# Patient Record
Sex: FEMALE | Race: WHITE | NOT HISPANIC OR LATINO | Employment: FULL TIME | URBAN - METROPOLITAN AREA
[De-identification: names, ages, dates, MRNs, and addresses within clinical notes are randomized per-mention and may not be internally consistent; named-entity substitution may affect disease eponyms.]

---

## 2024-08-26 ENCOUNTER — TELEPHONE (OUTPATIENT)
Dept: OBSTETRICS AND GYNECOLOGY | Facility: CLINIC | Age: 29
End: 2024-08-26
Payer: COMMERCIAL

## 2024-08-26 ENCOUNTER — LAB (OUTPATIENT)
Dept: LAB | Facility: LAB | Age: 29
End: 2024-08-26
Payer: COMMERCIAL

## 2024-08-26 DIAGNOSIS — O20.9 FIRST TRIMESTER BLEEDING (HHS-HCC): ICD-10-CM

## 2024-08-26 DIAGNOSIS — O20.9 FIRST TRIMESTER BLEEDING (HHS-HCC): Primary | ICD-10-CM

## 2024-08-26 LAB — B-HCG SERPL-ACNC: 228 MIU/ML

## 2024-08-26 PROCEDURE — 36415 COLL VENOUS BLD VENIPUNCTURE: CPT

## 2024-08-27 DIAGNOSIS — O20.9 FIRST TRIMESTER BLEEDING (HHS-HCC): Primary | ICD-10-CM

## 2024-08-28 ENCOUNTER — LAB (OUTPATIENT)
Dept: LAB | Facility: LAB | Age: 29
End: 2024-08-28
Payer: COMMERCIAL

## 2024-08-28 DIAGNOSIS — O20.9 FIRST TRIMESTER BLEEDING (HHS-HCC): ICD-10-CM

## 2024-08-28 LAB — B-HCG SERPL-ACNC: 526 MIU/ML

## 2024-08-28 PROCEDURE — 36415 COLL VENOUS BLD VENIPUNCTURE: CPT

## 2024-09-04 ENCOUNTER — TELEPHONE (OUTPATIENT)
Dept: OBSTETRICS AND GYNECOLOGY | Facility: CLINIC | Age: 29
End: 2024-09-04
Payer: COMMERCIAL

## 2024-09-04 DIAGNOSIS — O21.9 NAUSEA AND VOMITING IN PREGNANCY PRIOR TO 22 WEEKS GESTATION (HHS-HCC): Primary | ICD-10-CM

## 2024-09-04 RX ORDER — DOXYLAMINE SUCCINATE AND PYRIDOXINE HYDROCHLORIDE 20; 20 MG/1; MG/1
1 TABLET, EXTENDED RELEASE ORAL 2 TIMES DAILY
Qty: 60 TABLET | Refills: 3 | Status: SHIPPED | OUTPATIENT
Start: 2024-09-04

## 2024-09-04 NOTE — TELEPHONE ENCOUNTER
Patient with nausea, but no significant vomiting.  Used Bonjesta last pregnancy, requesting this again. I will send it to her pharmacy.  Vandana Lazar.

## 2024-09-05 ENCOUNTER — TELEPHONE (OUTPATIENT)
Dept: OBSTETRICS AND GYNECOLOGY | Facility: CLINIC | Age: 29
End: 2024-09-05
Payer: COMMERCIAL

## 2024-09-18 ENCOUNTER — LAB (OUTPATIENT)
Dept: LAB | Facility: LAB | Age: 29
End: 2024-09-18
Payer: COMMERCIAL

## 2024-09-18 ENCOUNTER — APPOINTMENT (OUTPATIENT)
Dept: OBSTETRICS AND GYNECOLOGY | Facility: CLINIC | Age: 29
End: 2024-09-18
Payer: COMMERCIAL

## 2024-09-18 VITALS — WEIGHT: 134.8 LBS | DIASTOLIC BLOOD PRESSURE: 70 MMHG | SYSTOLIC BLOOD PRESSURE: 108 MMHG | BODY MASS INDEX: 24.66 KG/M2

## 2024-09-18 DIAGNOSIS — Z34.80 SUPERVISION OF OTHER NORMAL PREGNANCY, ANTEPARTUM (HHS-HCC): ICD-10-CM

## 2024-09-18 DIAGNOSIS — Z32.01 PREGNANCY TEST POSITIVE (HHS-HCC): ICD-10-CM

## 2024-09-18 PROBLEM — O02.0 BLIGHTED OVUM (HHS-HCC): Status: RESOLVED | Noted: 2024-09-18 | Resolved: 2024-09-18

## 2024-09-18 PROBLEM — F41.9 ANXIETY: Status: ACTIVE | Noted: 2024-09-18

## 2024-09-18 LAB
ABO GROUP (TYPE) IN BLOOD: NORMAL
ANTIBODY SCREEN: NORMAL
CRL: 12 MM
ERYTHROCYTE [DISTWIDTH] IN BLOOD BY AUTOMATED COUNT: 12.6 % (ref 11.5–14.5)
EST. AVERAGE GLUCOSE BLD GHB EST-MCNC: 91 MG/DL
HBA1C MFR BLD: 4.8 %
HBV SURFACE AG SERPL QL IA: NONREACTIVE
HCT VFR BLD AUTO: 45 % (ref 36–46)
HCV AB SER QL: NONREACTIVE
HGB BLD-MCNC: 14.7 G/DL (ref 12–16)
HIV 1+2 AB+HIV1 P24 AG SERPL QL IA: NONREACTIVE
MCH RBC QN AUTO: 31 PG (ref 26–34)
MCHC RBC AUTO-ENTMCNC: 32.7 G/DL (ref 32–36)
MCV RBC AUTO: 95 FL (ref 80–100)
NRBC BLD-RTO: 0 /100 WBCS (ref 0–0)
PLATELET # BLD AUTO: 186 X10*3/UL (ref 150–450)
PREGNANCY TEST URINE, POC: POSITIVE
RBC # BLD AUTO: 4.74 X10*6/UL (ref 4–5.2)
REFLEX ADDED, ANEMIA PANEL: NORMAL
RH FACTOR (ANTIGEN D): NORMAL
RUBV IGG SERPL IA-ACNC: 2.5 IA
RUBV IGG SERPL QL IA: POSITIVE
TREPONEMA PALLIDUM IGG+IGM AB [PRESENCE] IN SERUM OR PLASMA BY IMMUNOASSAY: NONREACTIVE
WBC # BLD AUTO: 11.9 X10*3/UL (ref 4.4–11.3)

## 2024-09-18 PROCEDURE — 0500F INITIAL PRENATAL CARE VISIT: CPT | Performed by: NURSE PRACTITIONER

## 2024-09-18 PROCEDURE — 83036 HEMOGLOBIN GLYCOSYLATED A1C: CPT

## 2024-09-18 PROCEDURE — 86317 IMMUNOASSAY INFECTIOUS AGENT: CPT

## 2024-09-18 PROCEDURE — 86850 RBC ANTIBODY SCREEN: CPT

## 2024-09-18 PROCEDURE — 87340 HEPATITIS B SURFACE AG IA: CPT

## 2024-09-18 PROCEDURE — 85027 COMPLETE CBC AUTOMATED: CPT

## 2024-09-18 PROCEDURE — 86780 TREPONEMA PALLIDUM: CPT

## 2024-09-18 PROCEDURE — 87389 HIV-1 AG W/HIV-1&-2 AB AG IA: CPT

## 2024-09-18 PROCEDURE — 36415 COLL VENOUS BLD VENIPUNCTURE: CPT

## 2024-09-18 PROCEDURE — 86803 HEPATITIS C AB TEST: CPT

## 2024-09-18 PROCEDURE — 86900 BLOOD TYPING SEROLOGIC ABO: CPT

## 2024-09-18 PROCEDURE — 76817 TRANSVAGINAL US OBSTETRIC: CPT | Performed by: NURSE PRACTITIONER

## 2024-09-18 PROCEDURE — 86901 BLOOD TYPING SEROLOGIC RH(D): CPT

## 2024-09-18 PROCEDURE — 81025 URINE PREGNANCY TEST: CPT | Performed by: NURSE PRACTITIONER

## 2024-09-18 ASSESSMENT — ENCOUNTER SYMPTOMS
NEUROLOGICAL NEGATIVE: 1
ALLERGIC/IMMUNOLOGIC NEGATIVE: 1
PSYCHIATRIC NEGATIVE: 1
RESPIRATORY NEGATIVE: 1
CONSTITUTIONAL NEGATIVE: 1
CARDIOVASCULAR NEGATIVE: 1
MUSCULOSKELETAL NEGATIVE: 1
ENDOCRINE NEGATIVE: 1
EYES NEGATIVE: 1
HEMATOLOGIC/LYMPHATIC NEGATIVE: 1
NAUSEA: 1

## 2024-09-18 NOTE — PROGRESS NOTES
HPI    LMP 2024   7.5 WKS  DEREK 2025  G 3, P 1  Last edited by Yamilet López MA on 2024 10:18 AM.         India Abarca is a 29 y.o.  female who presents for a pregnancy confirmation.     Patient's last menstrual period was 2024.  She is 7w5d with DEREK 2025.  Patient's menstrual cycles are regular, every 30 days, lasting 6-7 days.  She had +home pregnancy test on .   Did have light bleeding end of August, no bleeding since.   Reports symptoms of nausea, breast tenderness.   She is a non-smoker.    Her pregnancy is complicated by:  Uncomplicated    Last pap 2022: Negative    PMH  Anxiety    /70   Wt 61.1 kg (134 lb 12.8 oz)   LMP 2024   BMI 24.66 kg/m²      Current Outpatient Medications   Medication Instructions    doxylamine-pyridoxine, vit B6, (Bonjesta) 20-20 mg tablet,IR,delayed rel,biphasic 1 tablet, oral, 2 times daily, Once in the morning and once at bedtime.        Review of Systems   Constitutional: Negative.    HENT: Negative.     Eyes: Negative.    Respiratory: Negative.     Cardiovascular: Negative.    Gastrointestinal:  Positive for nausea.   Endocrine: Negative.    Genitourinary: Negative.    Musculoskeletal: Negative.    Skin: Negative.    Allergic/Immunologic: Negative.    Neurological: Negative.    Hematological: Negative.    Psychiatric/Behavioral: Negative.     All other systems reviewed and are negative.       Physical Exam  Constitutional:       Appearance: Normal appearance.   HENT:      Head: Normocephalic.      Nose: Nose normal.   Pulmonary:      Effort: Pulmonary effort is normal.   Genitourinary:     General: Normal vulva.      Vagina: Normal.      Cervix: Normal.      Uterus: Normal.       Adnexa: Right adnexa normal and left adnexa normal.   Musculoskeletal:         General: Normal range of motion.      Cervical back: Normal range of motion and neck supple.   Neurological:      Mental Status: She is alert.   Psychiatric:          Mood and Affect: Mood normal.         Behavior: Behavior normal.     BSUS: Single IUP with +YS and +FCA. CRL 7w3d which is c/w LMP    Assessment/Plan   Diagnoses and all orders for this visit:  Pregnancy test positive ([facility])  -Seen today for confirmation of pregnancy.  -Pregnancy confirmed with +urine pregnancy test and trans-vaginal US performed today.  -     POCT pregnancy, urine manually resulted  -     US OB transvaginal   Supervision of other normal pregnancy, antepartum ([facility])  -Viable, mancini pregnancy with +fetal heartbeat and US confirming gestational age that is c/w LMP.  -DEREK 05/02/2025.  -Orders for prenatal lab work today.  -     CBC Anemia Panel With Reflex,Pregnancy; Future  -     Hemoglobin A1C; Future  -     Hepatitis B Surface Antigen; Future  -     Hepatitis C Antibody; Future  -     HIV 1/2 Antigen/Antibody Screen with Reflex to Confirmation; Future  -     Rubella Antibody, IgG; Future  -     Syphilis Screen with Reflex; Future  -     Type And Screen; Future  -Genetic screening options discussed, patient planning on NIPT.   -Follow up for new OB 2 weeks.

## 2024-10-02 ENCOUNTER — APPOINTMENT (OUTPATIENT)
Dept: OBSTETRICS AND GYNECOLOGY | Facility: CLINIC | Age: 29
End: 2024-10-02
Payer: COMMERCIAL

## 2024-10-02 VITALS — DIASTOLIC BLOOD PRESSURE: 66 MMHG | BODY MASS INDEX: 24.87 KG/M2 | WEIGHT: 136 LBS | SYSTOLIC BLOOD PRESSURE: 100 MMHG

## 2024-10-02 DIAGNOSIS — Z13.79 GENETIC TESTING: ICD-10-CM

## 2024-10-02 DIAGNOSIS — O21.9 NAUSEA AND VOMITING IN PREGNANCY PRIOR TO 22 WEEKS GESTATION: ICD-10-CM

## 2024-10-02 DIAGNOSIS — Z34.80 SUPERVISION OF OTHER NORMAL PREGNANCY, ANTEPARTUM (HHS-HCC): ICD-10-CM

## 2024-10-02 LAB
POC APPEARANCE, URINE: CLEAR
POC BILIRUBIN, URINE: NEGATIVE
POC BLOOD, URINE: NEGATIVE
POC COLOR, URINE: YELLOW
POC GLUCOSE, URINE: NEGATIVE MG/DL
POC KETONES, URINE: NEGATIVE MG/DL
POC LEUKOCYTES, URINE: NEGATIVE
POC NITRITE,URINE: NEGATIVE
POC PH, URINE: 6.5 PH
POC PROTEIN, URINE: NEGATIVE MG/DL
POC SPECIFIC GRAVITY, URINE: 1.01
POC UROBILINOGEN, URINE: 0.2 EU/DL

## 2024-10-02 PROCEDURE — 0501F PRENATAL FLOW SHEET: CPT | Performed by: NURSE PRACTITIONER

## 2024-10-02 PROCEDURE — 87491 CHLMYD TRACH DNA AMP PROBE: CPT

## 2024-10-02 PROCEDURE — 87591 N.GONORRHOEAE DNA AMP PROB: CPT

## 2024-10-02 PROCEDURE — 87086 URINE CULTURE/COLONY COUNT: CPT

## 2024-10-02 RX ORDER — DOXYLAMINE SUCCINATE AND PYRIDOXINE HYDROCHLORIDE 20; 20 MG/1; MG/1
1 TABLET, EXTENDED RELEASE ORAL 2 TIMES DAILY
Qty: 60 TABLET | Refills: 3 | Status: SHIPPED | OUTPATIENT
Start: 2024-10-02

## 2024-10-02 NOTE — PROGRESS NOTES
India Abarca is a  at 9w5d with a working estimated date of delivery of 2025, by Last Menstrual Period who presents for a routine prenatal visit.   She is feeling well. Denies vaginal bleeding. Nausea improved with Bonjesta.     Prenatal labs normal. Rh+  Pap 2022 negative  NG/CT, Ucx collected today  Genetic screening: Options reviewed and discussed. Desires NIPT and first trimester ultrasound, ordered today.     Her pregnancy is complicated by:  Uncomplicated    Objective   Physical Exam  Weight: 61.7 kg (136 lb), Pregravid BMI: 24.69  Expected Total Weight Gain: 11.5 kg (25 lb)-16 kg (35 lb)   BP: 100/66  Fetal Heart Rate:  (+US)      Imaging  BSUS: S=D, +FCA     Assessment/Plan   Diagnoses and all orders for this visit:  Supervision of other normal pregnancy, antepartum ([facility])  -     POCT UA (nonautomated) manually resulted  -     Urine Culture  -     [facility] Prequel Prenatal Screen; Future  -     C. trachomatis / N. gonorrhoeae, Amplified  -     US MAC OB imaging order; Future  Nausea and vomiting in pregnancy prior to 22 weeks gestation  -     doxylamine-pyridoxine, vit B6, (Bonjesta) 20-20 mg tablet,IR,delayed rel,biphasic; Take 1 tablet by mouth 2 times a day. Once in the morning and once at bedtime.  Genetic testing  -     [facility] Prequel Prenatal Screen; Future  -     US MAC OB imaging order; Future  Patient oriented to practice including shared OB call and how to reach physician on call  NG/CT, urine cultures collected  Prequel and first trimester US ordered  Refills Bonjesta  Follow up in 4 weeks for a routine prenatal visit or sooner if needed

## 2024-10-03 LAB
BACTERIA UR CULT: NORMAL
C TRACH RRNA SPEC QL NAA+PROBE: NEGATIVE
N GONORRHOEA DNA SPEC QL PROBE+SIG AMP: NEGATIVE

## 2024-10-07 ENCOUNTER — LAB (OUTPATIENT)
Dept: LAB | Facility: LAB | Age: 29
End: 2024-10-07
Payer: COMMERCIAL

## 2024-10-17 LAB
COMMENTS - MP RESULT TYPE: NORMAL
SCAN RESULT: NORMAL

## 2024-10-21 PROBLEM — Z34.90 ENCOUNTER FOR SUPERVISION OF NORMAL PREGNANCY, ANTEPARTUM: Status: ACTIVE | Noted: 2024-10-21

## 2024-10-22 ENCOUNTER — HOSPITAL ENCOUNTER (OUTPATIENT)
Dept: RADIOLOGY | Facility: CLINIC | Age: 29
Discharge: HOME | End: 2024-10-22
Payer: COMMERCIAL

## 2024-10-22 DIAGNOSIS — Z34.80 SUPERVISION OF OTHER NORMAL PREGNANCY, ANTEPARTUM (HHS-HCC): ICD-10-CM

## 2024-10-22 DIAGNOSIS — Z13.79 GENETIC TESTING: ICD-10-CM

## 2024-10-22 PROCEDURE — 76813 OB US NUCHAL MEAS 1 GEST: CPT | Performed by: OBSTETRICS & GYNECOLOGY

## 2024-10-22 PROCEDURE — 76813 OB US NUCHAL MEAS 1 GEST: CPT

## 2024-10-31 ENCOUNTER — APPOINTMENT (OUTPATIENT)
Dept: OBSTETRICS AND GYNECOLOGY | Facility: CLINIC | Age: 29
End: 2024-10-31
Payer: COMMERCIAL

## 2024-10-31 VITALS — WEIGHT: 139.6 LBS | BODY MASS INDEX: 25.53 KG/M2 | SYSTOLIC BLOOD PRESSURE: 130 MMHG | DIASTOLIC BLOOD PRESSURE: 82 MMHG

## 2024-10-31 DIAGNOSIS — Z34.80 SUPERVISION OF OTHER NORMAL PREGNANCY, ANTEPARTUM (HHS-HCC): ICD-10-CM

## 2024-10-31 DIAGNOSIS — Z36.3 SCREENING, ANTENATAL, FOR MALFORMATION BY ULTRASOUND: Primary | ICD-10-CM

## 2024-10-31 LAB
POC BLOOD, URINE: NEGATIVE
POC GLUCOSE, URINE: NEGATIVE MG/DL
POC KETONES, URINE: ABNORMAL MG/DL
POC LEUKOCYTES, URINE: NEGATIVE
POC NITRITE,URINE: NEGATIVE
POC PROTEIN, URINE: NEGATIVE MG/DL

## 2024-10-31 PROCEDURE — 0501F PRENATAL FLOW SHEET: CPT | Performed by: OBSTETRICS & GYNECOLOGY

## 2024-11-21 ENCOUNTER — TELEPHONE (OUTPATIENT)
Dept: OBSTETRICS AND GYNECOLOGY | Facility: CLINIC | Age: 29
End: 2024-11-21
Payer: COMMERCIAL

## 2024-11-29 ENCOUNTER — APPOINTMENT (OUTPATIENT)
Dept: OBSTETRICS AND GYNECOLOGY | Facility: CLINIC | Age: 29
End: 2024-11-29
Payer: COMMERCIAL

## 2024-11-29 VITALS — BODY MASS INDEX: 27.07 KG/M2 | SYSTOLIC BLOOD PRESSURE: 120 MMHG | WEIGHT: 148 LBS | DIASTOLIC BLOOD PRESSURE: 64 MMHG

## 2024-11-29 DIAGNOSIS — Z34.80 SUPERVISION OF OTHER NORMAL PREGNANCY, ANTEPARTUM (HHS-HCC): ICD-10-CM

## 2024-11-29 LAB
POC BLOOD, URINE: NEGATIVE
POC GLUCOSE, URINE: NEGATIVE MG/DL
POC KETONES, URINE: NEGATIVE MG/DL
POC LEUKOCYTES, URINE: NEGATIVE
POC NITRITE,URINE: NEGATIVE
POC PROTEIN, URINE: ABNORMAL MG/DL

## 2024-11-29 PROCEDURE — 0501F PRENATAL FLOW SHEET: CPT | Performed by: OBSTETRICS & GYNECOLOGY

## 2024-11-29 NOTE — PROGRESS NOTES
Subjective   Patient ID [id number]   India Saleem is a 29 y.o.  at 18w0d with a working estimated date of delivery of 2025, by Last Menstrual Period who presents for a routine prenatal visit. She denies vaginal bleeding, leakage of fluid, decreased fetal movements, or contractions.    Her pregnancy is complicated by:  Uncomplicated    Objective   Physical Exam  Weight: 67.1 kg (148 lb)  Expected Total Weight Gain: 11.5 kg (25 lb)-16 kg (35 lb)   Pregravid BMI: 24.69  BP: 120/64         Prenatal Labs  Urine dip:  Lab Results   Component Value Date    KETONESU TRACE (A) 10/31/2024       Lab Results   Component Value Date    HGB 12.9 2024    HCT 38.1 2024    ABO O 2024    HEPBSAG Nonreactive 2024           Imaging: anatomy US next week    Assessment/Plan   Problem List Items Addressed This Visit             ICD-10-CM    Encounter for supervision of normal pregnancy, antepartum Z34.90    Relevant Orders    POCT UA Automated manually resulted     Medical Problems       Problem List       Encounter for supervision of normal pregnancy, antepartum    Overview Addendum 2024 10:47 AM by Myriam Holman DO     28 yo     1st Trimester  [x]  OB profile/lab work Normal  [x]  Pap 2022 Negative  [x]  GC DNA probe 10/02 NEG/NEG  [x]  Urine culture 10/02 NEG  [x]  Hgb A1c Normal 4.8%  [x]  Aneuploidy screening (Prequel 10+ wk/First Trimester Check 11-14 wk) PREQUEL 10-7-24 NEG  [x]  Carrier screening Declines    2nd Trimester  []  Anatomy US (19-21 wks) SCHEDULED   []  1 hour Glucose (25-28 wks)  []  CBC (25-28 wks)  []  3 hour GTT (if needed)    3rd Trimester  []  GBS (36-37 wks)  []  L&D consent    Vaccines  [x]  COVID Initial x2, no booster  []  Flu (September to May) DECLINES  []  Tdap (27-36 wks)  []  RSV (32-36 wks Sept-)            Anxiety    Overview Signed 10/30/2024 10:57 PM by Myriam Holman DO     No meds at this time  Monitor for acute worsening  postpartum               Continue prenatal vitamin.  Labs reviewed.  Rhogam Not indicated  GTT 25-28 weeks.    Follow up in 4 weeks for a routine prenatal visit.  Myriam Holman, DO

## 2024-11-29 NOTE — PATIENT INSTRUCTIONS
You were seen in the office today for routine OB care and had normal findings on exam  Continue routine OB precautions at home  Continue taking prenatal vitamins   Avoid sick contacts and consider getting your Flu (available in office during flu season) and COVID vaccines to protect against infection in pregnancy  You will be given an order for your anatomy ultrasound. Please schedule at [facility] OB imaging between 19 and 22 weeks gestation [phone]  You will be given a bottle of Glucola and orders for your second trimester labs. Please complete these between 25 and 28 weeks gestation. You may complete these at any  outpatient lab, and do not need an appointment  Make an appointment for routine care in the office in the next 4 weeks  If you are having any concerns prior to your next visit please call the office to speak to the physician on call. This includes after hours, weekends, and holidays, when the answering service will be able to connect you with the physician on call. [phone] ([facility]) or [phone] [facility].